# Patient Record
Sex: FEMALE | Race: WHITE | URBAN - METROPOLITAN AREA
[De-identification: names, ages, dates, MRNs, and addresses within clinical notes are randomized per-mention and may not be internally consistent; named-entity substitution may affect disease eponyms.]

---

## 2017-08-28 ENCOUNTER — TRANSFERRED RECORDS (OUTPATIENT)
Dept: HEALTH INFORMATION MANAGEMENT | Facility: CLINIC | Age: 27
End: 2017-08-28

## 2017-08-28 ENCOUNTER — MEDICAL CORRESPONDENCE (OUTPATIENT)
Dept: HEALTH INFORMATION MANAGEMENT | Facility: CLINIC | Age: 27
End: 2017-08-28

## 2017-08-30 ENCOUNTER — OFFICE VISIT (OUTPATIENT)
Dept: OPHTHALMOLOGY | Facility: CLINIC | Age: 27
End: 2017-08-30
Attending: OPHTHALMOLOGY
Payer: COMMERCIAL

## 2017-08-30 DIAGNOSIS — H51.11 CONVERGENCE INSUFFICIENCY: ICD-10-CM

## 2017-08-30 DIAGNOSIS — G43.109 OCULAR MIGRAINE: Primary | ICD-10-CM

## 2017-08-30 PROCEDURE — 99213 OFFICE O/P EST LOW 20 MIN: CPT | Mod: ZF

## 2017-08-30 RX ORDER — CETIRIZINE HYDROCHLORIDE 10 MG/1
10 TABLET ORAL DAILY
COMMUNITY

## 2017-08-30 ASSESSMENT — EXTERNAL EXAM - LEFT EYE: OS_EXAM: NORMAL

## 2017-08-30 ASSESSMENT — VISUAL ACUITY
OD_SC: 20/15
OS_SC: 20/15
METHOD: SNELLEN - LINEAR

## 2017-08-30 ASSESSMENT — SLIT LAMP EXAM - LIDS
COMMENTS: NORMAL
COMMENTS: NORMAL

## 2017-08-30 ASSESSMENT — TONOMETRY
IOP_METHOD: TONOPEN
OS_IOP_MMHG: 13
OD_IOP_MMHG: 13

## 2017-08-30 ASSESSMENT — REFRACTION_MANIFEST
OS_CYLINDER: +0.25
OD_SPHERE: PLANO
OS_SPHERE: PLANO

## 2017-08-30 ASSESSMENT — CONF VISUAL FIELD
OD_NORMAL: 1
OS_NORMAL: 1

## 2017-08-30 ASSESSMENT — CUP TO DISC RATIO
OS_RATIO: 0.2
OD_RATIO: 0.2

## 2017-08-30 ASSESSMENT — EXTERNAL EXAM - RIGHT EYE: OD_EXAM: NORMAL

## 2017-08-30 NOTE — NURSING NOTE
Chief Complaints and History of Present Illnesses   Patient presents with     COMPREHENSIVE EYE EXAM     HPI    Affected eye(s):  Both   Symptoms:     Blurred vision   Double vision   No floaters   Flashes      Duration:  6 days   Frequency:  Constant          Comments:  Pt. stated Flashes and double vision OU  along with headaches daily that she feels across her eye brow area for the last 6 days.  Marc Espinoza  7:54 AM August 30, 2017

## 2017-08-30 NOTE — MR AVS SNAPSHOT
After Visit Summary   2017    Yazmin Bangura    MRN: [id number]           Patient Information     Date Of Birth          1990        Visit Information        Provider Department      2017 7:45 AM [facility]        Today's Diagnoses     Ocular migraine    -  1    Convergence insufficiency           Follow-ups after your visit        Follow-up notes from your care team     Return in about 1 year (around 2018).      Who to contact     Please call your clinic at [phone] to:    Ask questions about your health    Make or cancel appointments    Discuss your medicines    Learn about your test results    Speak to your doctor   If you have compliments or concerns about an experience at your clinic, or if you wish to file a complaint, please contact [facility] Patient Relations at [phone] or email us at [email or web address]         Additional Information About Your Visit        MyChart Information     [employer] is an electronic gateway that provides easy, online access to your medical records. With [employer], you can request a clinic appointment, read your test results, renew a prescription or communicate with your care team.     To sign up for [employer] visit the website at [email or web address]   You will be asked to enter the access code listed below, as well as some personal information. Please follow the directions to create your username and password.     Your access code is: [email or web address]  Expires: 2017  6:30 AM     Your access code will  in 90 days. If you need help or a new code, please contact your [facility] or call [phone] for assistance.        Care EveryWhere ID     This is your Care EveryWhere ID. This could be used by other organizations to access your [place] medical records  [id number]         Blood Pressure from Last 3 Encounters:   12/20/15 100/67     Weight from Last 3 Encounters:   No data found for Wt              Today, you had the following     No orders found for display       Primary Care Provider Office Phone # Fax #    [facility] [phone]       [address] [place] [id number]        Equal Access to Services     BENEDICT GEORGES : Hadii aad ku hadasho Soomaali, waaxda luqadaha, qaybta kaalmada adeegyada, waxay alissain haynorisn adeepifanio samueldhavalefren sarkar. So [facility] [phone].    ATENCIÓN: Si habla español, tiene a lipscomb disposición servicios gratuitos de asistencia lingüística. Llame al [phone].    We comply with applicable federal civil rights laws and [place] laws. We do not discriminate on the basis of race, color, national origin, age, disability sex, sexual orientation or gender identity.            Thank you!     Thank you for choosing [facility]  for your care. Our goal is always to provide you with excellent care. Hearing back from our patients is one way we can continue to improve our services. Please take a few minutes to complete the written survey that you may receive in the mail after your visit with us. Thank you!             Your Updated Medication List - Protect others around you: Learn how to safely use, store and throw away your medicines at www.disposemymeds.org.          This list is accurate as of: 8/30/17  8:43 AM.  Always use your most recent med list.                   Brand Name Dispense Instructions for use Diagnosis    bacitracin ointment     60 g    Apply topically 2 times daily        CELEXA PO      Take 20 mg by mouth        cetirizine 10 MG tablet    zyrTEC     Take 10 mg by mouth daily        HYDROXYZINE HCL PO      Take 50 mg by mouth At Bedtime        LORAZEPAM PO      Take 0.5 mg by mouth        MULTIVITAMIN PO

## 2017-08-30 NOTE — PROGRESS NOTES
"HPI  Yazmin Bangura is a 27 year old female here for evaluation of headaches and vision changes. She has been having frontal headaches daily for the week or so. For 3 to 4 days, the headaches were accompanied by a \"shiny spot\" in her central vision in both eyes. The headaches have resolved with Tylenol or rest, and the vision changes go away with the headache. The last few days, she has had headache, but no vision changes. She has noted a little bit more difficulty focusing when reading and studying while she has the headache.    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 Exam within normal limits. Improving.  Plan: Discussed the diagnosis.    (H51.11) Convergence insufficiency  Comment: I do not think this is underlying her current issues.  Plan: Reviewed pencil push-ups.     -----------------------------------------------------------------------------------    Patient disposition:   Return in about 1 year (around 8/30/2018). or sooner as needed.    Teaching statement:  Complete documentation of historical and exam elements from today's encounter can be found in the full encounter summary report (not reduplicated in this progress note). I personally obtained the chief complaint(s) and history of present illness.  I confirmed and edited as necessary the review of systems, past medical/surgical history, family history, social history, and examination findings as documented by others; and I examined the patient myself. I personally reviewed the relevant tests, images, and reports as documented above.     I formulated and edited as necessary the assessment and plan and discussed the findings and management plan with the patient and family.    Abbie Arnold MD  Comprehensive Ophthalmology & Ocular Pathology  Department of Ophthalmology and " Visual Neurosciences  [email or web address]  Pager [phone]

## 2025-05-02 PROCEDURE — 88305 TISSUE EXAM BY PATHOLOGIST: CPT | Mod: TC,ORL | Performed by: OTOLARYNGOLOGY

## 2025-05-02 PROCEDURE — 88305 TISSUE EXAM BY PATHOLOGIST: CPT | Mod: 26 | Performed by: PATHOLOGY

## 2025-05-03 ENCOUNTER — LAB REQUISITION (OUTPATIENT)
Dept: LAB | Facility: CLINIC | Age: 35
End: 2025-05-03
Payer: COMMERCIAL

## 2025-05-03 DIAGNOSIS — D49.2 NEOPLASM OF UNSPECIFIED BEHAVIOR OF BONE, SOFT TISSUE, AND SKIN: ICD-10-CM

## 2025-05-05 ENCOUNTER — LAB REQUISITION (OUTPATIENT)
Dept: LAB | Facility: CLINIC | Age: 35
End: 2025-05-05
Payer: COMMERCIAL

## 2025-05-05 DIAGNOSIS — D49.2 NEOPLASM OF UNSPECIFIED BEHAVIOR OF BONE, SOFT TISSUE, AND SKIN: ICD-10-CM

## 2025-05-07 LAB
PATH REPORT.COMMENTS IMP SPEC: NORMAL
PATH REPORT.COMMENTS IMP SPEC: NORMAL
PATH REPORT.FINAL DX SPEC: NORMAL
PATH REPORT.GROSS SPEC: NORMAL
PATH REPORT.MICROSCOPIC SPEC OTHER STN: NORMAL
PATH REPORT.RELEVANT HX SPEC: NORMAL
PHOTO IMAGE: NORMAL

## 2025-05-27 ENCOUNTER — OFFICE VISIT (OUTPATIENT)
Dept: URGENT CARE | Facility: URGENT CARE | Age: 35
End: 2025-05-27
Payer: COMMERCIAL

## 2025-05-27 VITALS
OXYGEN SATURATION: 98 % | HEIGHT: 70 IN | RESPIRATION RATE: 16 BRPM | HEART RATE: 84 BPM | DIASTOLIC BLOOD PRESSURE: 72 MMHG | TEMPERATURE: 98.2 F | SYSTOLIC BLOOD PRESSURE: 111 MMHG | BODY MASS INDEX: 25.65 KG/M2 | WEIGHT: 179.2 LBS

## 2025-05-27 DIAGNOSIS — O21.9 NAUSEA/VOMITING IN PREGNANCY: Primary | ICD-10-CM

## 2025-05-27 PROCEDURE — 3078F DIAST BP <80 MM HG: CPT | Performed by: NURSE PRACTITIONER

## 2025-05-27 PROCEDURE — 3074F SYST BP LT 130 MM HG: CPT | Performed by: NURSE PRACTITIONER

## 2025-05-27 PROCEDURE — 99203 OFFICE O/P NEW LOW 30 MIN: CPT | Performed by: NURSE PRACTITIONER

## 2025-05-27 RX ORDER — MECLIZINE HYDROCHLORIDE 25 MG/1
25 TABLET ORAL 3 TIMES DAILY PRN
Qty: 21 TABLET | Refills: 0 | Status: SHIPPED | OUTPATIENT
Start: 2025-05-27 | End: 2025-06-03

## 2025-05-27 RX ORDER — PRENATAL VIT/IRON FUM/FOLIC AC 27MG-0.8MG
1 TABLET ORAL DAILY
COMMUNITY

## 2025-05-27 NOTE — PROGRESS NOTES
Urgent Care Clinic Visit    Chief Complaint   Patient presents with    Urgent Care    Nausea, Vomiting, & Diarrhea     Pt reports 10 weeks pregnant  Nausea, vomiting and diarrhea onset 3 days   Drinking Pedialyte for hydration               5/27/2025     4:19 PM   Additional Questions   Roomed by Karen   Accompanied by self

## 2025-05-27 NOTE — PATIENT INSTRUCTIONS
Meclizine for vomiting.     Increase fluids with water, Pedialyte, Gatorade, or rehydrating beverages.     Take small frequent sips (even a little as a spoonful every 5 minutes), do not take large gulps or amounts of fluid at a time.     Follow clear liquid to BRAT diet (bananas, rice, applesauce, toast) as needed for any upset stomach. (If upset stomach avoid dairy and meat for 24 hours.)     Rest as much as possible.     Follow up at the Emergency Department if symptoms persist or worsen or you show signs of dehydration including decreased urination, lack of tears, dry mouth.

## 2025-05-27 NOTE — PROGRESS NOTES
Assessment & Plan       ICD-10-CM    1. Nausea/vomiting in pregnancy  O21.9 meclizine (ANTIVERT) 25 MG tablet      Per up-to-date Zofran should be avoided in the first trimester if possible.  Patient has been trying the Unisom with the B6.  She has been able to keep down some fluids but the diarrhea has been persistent.  Will give her prescription for meclizine.  She will continue with small frequent sips of hydrating fluids and following a brat diet.  If symptoms worsen she will go to the emergency department    Patient Instructions   Meclizine for vomiting.     Increase fluids with water, Pedialyte, Gatorade, or rehydrating beverages.     Take small frequent sips (even a little as a spoonful every 5 minutes), do not take large gulps or amounts of fluid at a time.     Follow clear liquid to BRAT diet (bananas, rice, applesauce, toast) as needed for any upset stomach. (If upset stomach avoid dairy and meat for 24 hours.)     Rest as much as possible.     Follow up at the Emergency Department if symptoms persist or worsen or you show signs of dehydration including decreased urination, lack of tears, dry mouth.       Patient agreed to the treatment plan and verbalized understanding.       Kendall Arce is a 35 year old female who presents to clinic today for the following health issues:  Chief Complaint   Patient presents with    Urgent Care    Nausea, Vomiting, & Diarrhea     Pt reports 10 weeks pregnant  Nausea, vomiting and diarrhea onset 3 days   Drinking Pedialyte for hydration     HPI  Patient states 3 days ago she started having nausea and vomiting.  She states approximately a day ago she started having consistent diarrhea but her vomiting has subsided.  She states is very watery nature and occurs every time she has something to eat or drink.  She denies any abdominal pain. Denies any sweats or chills. She denies any blood in her stool or black tarry stools. No recent travel outside the country. No  "drinking from any unclean water sources. No history of H. Pylori or C. Difficle. No recent antibiotic use.  No one else in the household is sick.  She states with her first pregnancy she did have nausea and vomiting.  Patient has been taking Unisom, Vitamin B6.     Review of Systems   All other systems reviewed and are negative.      Problem List:  There are no relevant problems documented for this patient.      Past Medical History:   Diagnosis Date    Anxiety          Social History     Tobacco Use    Smoking status: Never     Passive exposure: Never    Smokeless tobacco: Never   Substance Use Topics    Alcohol use: Yes     Comment: occasional           Objective    /72   Pulse 84   Temp 98.2  F (36.8  C) (Tympanic)   Resp 16   Ht 1.778 m (5' 10\")   Wt 81.3 kg (179 lb 3.2 oz)   SpO2 98%   Breastfeeding No   BMI 25.71 kg/m    Physical Exam  Vitals and nursing note reviewed.   Constitutional:       Appearance: Normal appearance.   HENT:      Head: Normocephalic and atraumatic.      Right Ear: Tympanic membrane and ear canal normal.      Left Ear: Tympanic membrane and ear canal normal.      Nose: Nose normal.      Mouth/Throat:      Mouth: Mucous membranes are moist.      Pharynx: Oropharynx is clear. No oropharyngeal exudate or posterior oropharyngeal erythema.   Eyes:      General:         Right eye: No discharge.         Left eye: No discharge.      Extraocular Movements: Extraocular movements intact.      Conjunctiva/sclera: Conjunctivae normal.      Pupils: Pupils are equal, round, and reactive to light.   Cardiovascular:      Rate and Rhythm: Normal rate and regular rhythm.      Heart sounds: Normal heart sounds.   Pulmonary:      Effort: Pulmonary effort is normal.      Breath sounds: Normal breath sounds.   Abdominal:      General: Abdomen is flat. Bowel sounds are normal.      Palpations: Abdomen is soft.      Tenderness: There is no abdominal tenderness.   Musculoskeletal:      Cervical back: " Normal range of motion and neck supple.   Lymphadenopathy:      Cervical: No cervical adenopathy.   Neurological:      Mental Status: She is alert.              Angelika Lovell NP

## 2025-06-14 ENCOUNTER — HEALTH MAINTENANCE LETTER (OUTPATIENT)
Age: 35
End: 2025-06-14